# Patient Record
Sex: FEMALE | Race: ASIAN | HISPANIC OR LATINO | URBAN - METROPOLITAN AREA
[De-identification: names, ages, dates, MRNs, and addresses within clinical notes are randomized per-mention and may not be internally consistent; named-entity substitution may affect disease eponyms.]

---

## 2018-05-22 PROBLEM — 238131007 OVERWEIGHT: Status: ACTIVE | Noted: 2018-05-22

## 2018-05-22 PROBLEM — 80774000 HELICOBACTER PYLORI: Status: ACTIVE | Noted: 2018-05-22

## 2022-04-30 ENCOUNTER — TELEPHONE ENCOUNTER (OUTPATIENT)
Dept: URBAN - METROPOLITAN AREA CLINIC 121 | Facility: CLINIC | Age: 72
End: 2022-04-30

## 2022-04-30 RX ORDER — OMEPRAZOLE 20 MG/1
1 CAPSULE PO BID CAPSULE, DELAYED RELEASE ORAL
OUTPATIENT
Start: 2015-11-11

## 2022-04-30 RX ORDER — CLARITHROMYCIN 500 MG/1
TAKE ONE TAB BY MOUTH TWICE DAILY TABLET ORAL
OUTPATIENT
Start: 2015-11-10 | End: 2015-11-24

## 2022-04-30 RX ORDER — OMEPRAZOLE 20 MG/1
TAKE ONE TAB BY MOUTH TWICE DAILY TABLET, DELAYED RELEASE ORAL
OUTPATIENT
Start: 2015-11-10 | End: 2015-11-24

## 2022-04-30 RX ORDER — AMOXICILLIN 500 MG/1
TAKE TWO TABS BY MOUTH TWICE DAILY TABLET, FILM COATED ORAL
OUTPATIENT
Start: 2015-11-10 | End: 2015-11-24

## 2022-05-01 ENCOUNTER — TELEPHONE ENCOUNTER (OUTPATIENT)
Dept: URBAN - METROPOLITAN AREA CLINIC 121 | Facility: CLINIC | Age: 72
End: 2022-05-01

## 2022-05-01 RX ORDER — OMEPRAZOLE 40 MG/1
TAKE 1 CAPSULE PO QAM CAPSULE, DELAYED RELEASE ORAL
Status: ACTIVE | COMMUNITY
Start: 2015-11-11

## 2022-05-01 RX ORDER — LORATADINE 5 MG
USE AS DIRECTED FOR COLONOSCOPY TABLET,CHEWABLE ORAL
Status: ACTIVE | COMMUNITY
Start: 2015-10-21

## 2024-11-14 ENCOUNTER — LAB OUTSIDE AN ENCOUNTER (OUTPATIENT)
Dept: URBAN - METROPOLITAN AREA CLINIC 82 | Facility: CLINIC | Age: 74
End: 2024-11-14

## 2024-11-14 ENCOUNTER — DASHBOARD ENCOUNTERS (OUTPATIENT)
Age: 74
End: 2024-11-14

## 2024-11-14 ENCOUNTER — OFFICE VISIT (OUTPATIENT)
Dept: URBAN - METROPOLITAN AREA CLINIC 82 | Facility: CLINIC | Age: 74
End: 2024-11-14
Payer: MEDICARE

## 2024-11-14 VITALS
HEIGHT: 64 IN | TEMPERATURE: 97.3 F | HEART RATE: 68 BPM | WEIGHT: 140 LBS | DIASTOLIC BLOOD PRESSURE: 78 MMHG | BODY MASS INDEX: 23.9 KG/M2 | SYSTOLIC BLOOD PRESSURE: 117 MMHG

## 2024-11-14 DIAGNOSIS — R10.13 EPIGASTRIC PAIN: ICD-10-CM

## 2024-11-14 DIAGNOSIS — R93.3 ABNORMAL CT SCAN, STOMACH: ICD-10-CM

## 2024-11-14 DIAGNOSIS — K76.89 HEPATIC CYST: ICD-10-CM

## 2024-11-14 PROBLEM — 129679001: Status: ACTIVE | Noted: 2024-11-14

## 2024-11-14 PROBLEM — 79922009: Status: ACTIVE | Noted: 2024-11-14

## 2024-11-14 PROBLEM — 85057007: Status: ACTIVE | Noted: 2024-11-14

## 2024-11-14 PROBLEM — 442182001: Status: ACTIVE | Noted: 2024-11-14

## 2024-11-14 PROCEDURE — 99204 OFFICE O/P NEW MOD 45 MIN: CPT | Performed by: STUDENT IN AN ORGANIZED HEALTH CARE EDUCATION/TRAINING PROGRAM

## 2024-11-14 RX ORDER — SODIUM, POTASSIUM,MAG SULFATES 17.5-3.13G
354ML SOLUTION, RECONSTITUTED, ORAL ORAL
Qty: 1 | Refills: 0 | OUTPATIENT
Start: 2024-11-14 | End: 2024-11-15

## 2024-11-14 NOTE — PHYSICAL EXAM CONSTITUTIONAL:
NAD, alert and oriented, well developed, well nourished, ambulating w a cane, son by side translating

## 2024-11-14 NOTE — HPI-TODAY'S VISIT:
11/14/24 74 y.o Cantonese speaking female w PMH of HTN, HLD presents today after being seen at [facility] ER on 10/3/24 for epigastric pain x months. CT A/P revealed several hepatic cysts, largest one measing [id number], left kidney cyst 5.5cm, mild thickening of the hepatic flexure of the colon and thickening of the stomach. Patient states that she is still dealing w/ epigastric pain that radiates outwards. She denies any pain w/ meals or BM. Pain worsen when she lays down. Pain comes and goes, worse w/ pressure to region. Not severe enough for medications use. She has no NV, dysphagia, heartburns.  BM: daily, no diarrhea/constipation, rectal bleeds. Wt stable. No FHx of CRC cancer, gastric cancer, IBD. Think she had colonoscopy >15 yrs ago, unsure of result. Unclear if she had EGD or not. No smoking or ETOH.

## 2025-01-08 ENCOUNTER — OFFICE VISIT (OUTPATIENT)
Dept: URBAN - METROPOLITAN AREA SURGERY CENTER 13 | Facility: SURGERY CENTER | Age: 75
End: 2025-01-08

## 2025-01-22 ENCOUNTER — OFFICE VISIT (OUTPATIENT)
Dept: URBAN - METROPOLITAN AREA SURGERY CENTER 13 | Facility: SURGERY CENTER | Age: 75
End: 2025-01-22

## 2025-01-28 ENCOUNTER — TELEPHONE ENCOUNTER (OUTPATIENT)
Dept: URBAN - METROPOLITAN AREA CLINIC 115 | Facility: CLINIC | Age: 75
End: 2025-01-28

## 2025-01-28 RX ORDER — SODIUM, POTASSIUM,MAG SULFATES 17.5-3.13G
AS DIRECTED SOLUTION, RECONSTITUTED, ORAL ORAL
Qty: 1 | Refills: 0 | OUTPATIENT
Start: 2025-01-29 | End: 2025-01-31

## 2025-02-13 ENCOUNTER — OFFICE VISIT (OUTPATIENT)
Dept: URBAN - METROPOLITAN AREA CLINIC 82 | Facility: CLINIC | Age: 75
End: 2025-02-13

## 2025-02-27 ENCOUNTER — TELEPHONE ENCOUNTER (OUTPATIENT)
Dept: URBAN - METROPOLITAN AREA CLINIC 82 | Facility: CLINIC | Age: 75
End: 2025-02-27

## 2025-03-04 ENCOUNTER — OFFICE VISIT (OUTPATIENT)
Dept: URBAN - METROPOLITAN AREA SURGERY CENTER 13 | Facility: SURGERY CENTER | Age: 75
End: 2025-03-04
Payer: MEDICARE

## 2025-03-04 ENCOUNTER — CLAIMS CREATED FROM THE CLAIM WINDOW (OUTPATIENT)
Dept: URBAN - METROPOLITAN AREA CLINIC 4 | Facility: CLINIC | Age: 75
End: 2025-03-04
Payer: MEDICARE

## 2025-03-04 DIAGNOSIS — Z12.11 COLON CANCER SCREENING: ICD-10-CM

## 2025-03-04 DIAGNOSIS — D12.3 BENIGN NEOPLASM OF TRANSVERSE COLON: ICD-10-CM

## 2025-03-04 DIAGNOSIS — K31.89 OTHER DISEASES OF STOMACH AND DUODENUM: ICD-10-CM

## 2025-03-04 DIAGNOSIS — D12.3 ADENOMA OF TRANSVERSE COLON: ICD-10-CM

## 2025-03-04 DIAGNOSIS — K31.89 GASTRIC FOVEOLAR HYPERPLASIA: ICD-10-CM

## 2025-03-04 DIAGNOSIS — D12.3 ADENOMATOUS POLYP OF TRANSVERSE COLON: ICD-10-CM

## 2025-03-04 DIAGNOSIS — K63.89 OTHER SPECIFIED DISEASES OF INTESTINE: ICD-10-CM

## 2025-03-04 PROCEDURE — 45385 COLONOSCOPY W/LESION REMOVAL: CPT | Performed by: INTERNAL MEDICINE

## 2025-03-04 PROCEDURE — 43239 EGD BIOPSY SINGLE/MULTIPLE: CPT | Performed by: INTERNAL MEDICINE

## 2025-03-04 PROCEDURE — 00813 ANES UPR LWR GI NDSC PX: CPT | Performed by: ANESTHESIOLOGIST ASSISTANT

## 2025-03-04 PROCEDURE — 88305 TISSUE EXAM BY PATHOLOGIST: CPT | Performed by: PATHOLOGY

## 2025-03-04 PROCEDURE — 00813 ANES UPR LWR GI NDSC PX: CPT | Performed by: ANESTHESIOLOGY

## 2025-03-04 PROCEDURE — 88312 SPECIAL STAINS GROUP 1: CPT | Performed by: PATHOLOGY

## 2025-04-03 ENCOUNTER — OFFICE VISIT (OUTPATIENT)
Dept: URBAN - METROPOLITAN AREA CLINIC 82 | Facility: CLINIC | Age: 75
End: 2025-04-03